# Patient Record
Sex: MALE | Race: WHITE
[De-identification: names, ages, dates, MRNs, and addresses within clinical notes are randomized per-mention and may not be internally consistent; named-entity substitution may affect disease eponyms.]

---

## 2018-02-01 ENCOUNTER — HOSPITAL ENCOUNTER (EMERGENCY)
Dept: HOSPITAL 25 - UCCORT | Age: 38
Discharge: HOME | End: 2018-02-01
Payer: COMMERCIAL

## 2018-02-01 VITALS — DIASTOLIC BLOOD PRESSURE: 72 MMHG | SYSTOLIC BLOOD PRESSURE: 123 MMHG

## 2018-02-01 DIAGNOSIS — Z88.0: ICD-10-CM

## 2018-02-01 DIAGNOSIS — L84: Primary | ICD-10-CM

## 2018-02-01 DIAGNOSIS — Z87.891: ICD-10-CM

## 2018-02-01 DIAGNOSIS — M79.671: ICD-10-CM

## 2018-02-01 PROCEDURE — 99211 OFF/OP EST MAY X REQ PHY/QHP: CPT

## 2018-02-01 PROCEDURE — G0463 HOSPITAL OUTPT CLINIC VISIT: HCPCS

## 2018-02-01 NOTE — UC
Skin Complaint HPI





- HPI Summary


HPI Summary: 





37 M with foot pain. Has had pain about 2 mo ago and gf dug out a sliver  had 

some discharge at that time but not at this time. no fever. got the sliver out 

but still with some pain . has been walking on it and pain with walking and 

pushing on the area. no redness any more. no discharge in the past month. has 

not seen PCP for this. 





- History of Current Complaint


Time Seen by Provider: 02/01/18 21:47


Stated Complaint: RIGHT FOOT PAIN


Hx Obtained From: Patient


Onset/Duration: Gradual Onset





- Allergy/Home Medications


Allergies/Adverse Reactions: 


 Allergies











Allergy/AdvReac Type Severity Reaction Status Date / Time


 


MS Penicillins [Penicillins] Allergy Mild bruising Verified 02/01/18 21:49


 


pollen Allergy  Congestion Uncoded 02/01/18 21:49











Home Medications: 


 Home Medications





NK [No Home Medications Reported]  02/01/18 [History Confirmed 02/01/18]











Review of Systems


Musculoskeletal: Arthralgia - right base of foot


All Other Systems Reviewed And Are Negative: Yes





PMH/Surg Hx/FS Hx/Imm Hx


Previously Healthy: Yes


Other History Of: 


   Negative For: HIV, Hepatitis B, Hepatitis C, Anticoagulant Therapy





- Surgical History


Surgical History: None





- Family History


Known Family History: 


   Negative: Cardiac Disease, Hypertension





- Social History


Occupation: Employed Full-time


Lives: With Family


Alcohol Use: Occasionally


Substance Use Type: None


Smoking Status (MU): Former Smoker - He


Type: Smokeless Tobacco


Amount Used/How Often: 1/3 Can Per Day


Length of Time of Smoking/Using Tobacco: 18 Years


Have You Smoked in the Last Year: Yes


When Did the Patient Quit Smoking/Using Tobacco: 5-6 years ago- quit chewing 1 

yr ago





- Immunization History


Most Recent Tetanus Shot: 2009





Physical Exam


Triage Information Reviewed: Yes


Appearance: Well-Appearing, No Pain Distress, Well-Nourished


Vital Signs Reviewed: Yes


Eyes: Positive: Conjunctiva Clear


ENT: Positive: Hearing grossly normal


Respiratory: Positive: No respiratory distress


Skin: Positive: Other - right heel medial with raised corn and tender to 

palpation





Course/Dx





- Course


Course Of Treatment: treat at home with pummice and corn / callous donut and go 

to podiatry if any concerns.





- Diagnoses


Provider Diagnoses: right heel corn/callous





Discharge





- Discharge Plan


Condition: Good


Disposition: HOME


Referrals: 


Navneet Blanco DPM [Doctor of Podiatric Medicine] -  (Podiatrist referral )


No Primary Care Phys,NOPCP [Primary Care Provider] - 


Additional Instructions: 


You appear to have a plantar corn at this time. We advise to use Dr Carter Coffey and Callous treatment at this time and if your symptoms do not improve to 

see a podiatrist .